# Patient Record
Sex: FEMALE | Race: WHITE | Employment: OTHER | URBAN - NONMETROPOLITAN AREA
[De-identification: names, ages, dates, MRNs, and addresses within clinical notes are randomized per-mention and may not be internally consistent; named-entity substitution may affect disease eponyms.]

---

## 2017-05-15 ENCOUNTER — OFFICE VISIT (OUTPATIENT)
Dept: PRIMARY CARE CLINIC | Age: 37
End: 2017-05-15
Payer: COMMERCIAL

## 2017-05-15 VITALS
WEIGHT: 161 LBS | TEMPERATURE: 98.2 F | SYSTOLIC BLOOD PRESSURE: 133 MMHG | HEIGHT: 63 IN | RESPIRATION RATE: 18 BRPM | BODY MASS INDEX: 28.53 KG/M2 | HEART RATE: 73 BPM | DIASTOLIC BLOOD PRESSURE: 82 MMHG

## 2017-05-15 DIAGNOSIS — I88.9 AXILLARY ADENITIS: ICD-10-CM

## 2017-05-15 DIAGNOSIS — F41.9 ANXIETY: ICD-10-CM

## 2017-05-15 DIAGNOSIS — Z00.00 WELLNESS EXAMINATION: Primary | ICD-10-CM

## 2017-05-15 PROCEDURE — 99385 PREV VISIT NEW AGE 18-39: CPT | Performed by: FAMILY MEDICINE

## 2017-05-15 RX ORDER — SERTRALINE HYDROCHLORIDE 25 MG/1
25 TABLET, FILM COATED ORAL DAILY
Qty: 30 TABLET | Refills: 3 | Status: SHIPPED | OUTPATIENT
Start: 2017-05-15 | End: 2017-08-10 | Stop reason: SDUPTHER

## 2017-05-15 ASSESSMENT — ENCOUNTER SYMPTOMS
VOMITING: 0
NAUSEA: 0
ABDOMINAL PAIN: 0
EYE DISCHARGE: 0
COUGH: 0
BACK PAIN: 0
COLOR CHANGE: 0
WHEEZING: 0
DIARRHEA: 0

## 2017-08-10 ENCOUNTER — OFFICE VISIT (OUTPATIENT)
Dept: PRIMARY CARE CLINIC | Age: 37
End: 2017-08-10
Payer: COMMERCIAL

## 2017-08-10 VITALS
TEMPERATURE: 98.3 F | HEART RATE: 77 BPM | HEIGHT: 63 IN | SYSTOLIC BLOOD PRESSURE: 120 MMHG | BODY MASS INDEX: 29.12 KG/M2 | OXYGEN SATURATION: 99 % | WEIGHT: 164.38 LBS | DIASTOLIC BLOOD PRESSURE: 80 MMHG

## 2017-08-10 DIAGNOSIS — R22.33 AXILLARY MASS, BILATERAL: Primary | ICD-10-CM

## 2017-08-10 PROCEDURE — 99213 OFFICE O/P EST LOW 20 MIN: CPT | Performed by: NURSE PRACTITIONER

## 2018-08-31 ENCOUNTER — OFFICE VISIT (OUTPATIENT)
Dept: URGENT CARE | Age: 38
End: 2018-08-31
Payer: COMMERCIAL

## 2018-08-31 VITALS
DIASTOLIC BLOOD PRESSURE: 83 MMHG | HEIGHT: 63 IN | RESPIRATION RATE: 20 BRPM | SYSTOLIC BLOOD PRESSURE: 119 MMHG | HEART RATE: 74 BPM | BODY MASS INDEX: 28.81 KG/M2 | TEMPERATURE: 99.1 F | OXYGEN SATURATION: 97 % | WEIGHT: 162.6 LBS

## 2018-08-31 DIAGNOSIS — J02.0 STREP PHARYNGITIS: Primary | ICD-10-CM

## 2018-08-31 LAB — S PYO AG THROAT QL: POSITIVE

## 2018-08-31 PROCEDURE — 87880 STREP A ASSAY W/OPTIC: CPT | Performed by: SPECIALIST

## 2018-08-31 PROCEDURE — 99213 OFFICE O/P EST LOW 20 MIN: CPT | Performed by: SPECIALIST

## 2018-08-31 RX ORDER — AMOXICILLIN AND CLAVULANATE POTASSIUM 875; 125 MG/1; MG/1
1 TABLET, FILM COATED ORAL EVERY 12 HOURS
Qty: 20 TABLET | Refills: 0 | Status: SHIPPED | OUTPATIENT
Start: 2018-08-31 | End: 2018-09-10

## 2018-08-31 ASSESSMENT — ENCOUNTER SYMPTOMS
COUGH: 1
SORE THROAT: 1

## 2018-08-31 NOTE — PROGRESS NOTES
external ear normal.   Mouth/Throat: Uvula is midline and mucous membranes are normal. Posterior oropharyngeal erythema present. Neck: Trachea normal.   Cardiovascular: Normal rate, regular rhythm, S1 normal, S2 normal and normal heart sounds. Pulmonary/Chest: Effort normal and breath sounds normal.   Lymphadenopathy:     She has cervical adenopathy. Neurological: She is alert and oriented to person, place, and time. Skin: Skin is warm, dry and intact. Psychiatric: She has a normal mood and affect. Her behavior is normal. Thought content normal.   Nursing note and vitals reviewed. /83   Pulse 74   Temp 99.1 °F (37.3 °C) (Oral)   Resp 20   Ht 5' 3\" (1.6 m)   Wt 162 lb 9.6 oz (73.8 kg)   LMP 08/10/2018   SpO2 97%   Breastfeeding? Yes   BMI 28.80 kg/m²     Assessment:       Diagnosis Orders   1. Strep pharyngitis  POCT rapid strep A     Results for orders placed or performed in visit on 08/31/18   POCT rapid strep A   Result Value Ref Range    Strep A Ag Positive (A) None Detected         Plan:      Orders Placed This Encounter   Procedures    POCT rapid strep A       No Follow-up on file. Orders Placed This Encounter   Procedures    POCT rapid strep A     Orders Placed This Encounter   Medications    amoxicillin-clavulanate (AUGMENTIN) 875-125 MG per tablet     Sig: Take 1 tablet by mouth every 12 hours for 10 days     Dispense:  20 tablet     Refill:  0       Patient given educational materials - see patient instructions. Discussed use, benefit, and side effects of prescribed medications. All patient questions answered. Pt voiced understanding. Reviewed health maintenance. Instructed to continue current medications, diet and exercise. Patient agreed with treatment plan. Follow up as directed.      Patient Instructions       Patient Education        Strep Throat: Care Instructions  Your Care Instructions    Strep throat is a bacterial infection that causes sudden, severe sore

## 2018-11-30 ENCOUNTER — OFFICE VISIT (OUTPATIENT)
Dept: URGENT CARE | Age: 38
End: 2018-11-30
Payer: COMMERCIAL

## 2018-11-30 VITALS
TEMPERATURE: 98.3 F | DIASTOLIC BLOOD PRESSURE: 72 MMHG | HEIGHT: 63 IN | OXYGEN SATURATION: 96 % | WEIGHT: 158.2 LBS | SYSTOLIC BLOOD PRESSURE: 133 MMHG | HEART RATE: 95 BPM | BODY MASS INDEX: 28.03 KG/M2 | RESPIRATION RATE: 18 BRPM

## 2018-11-30 DIAGNOSIS — J06.9 VIRAL URI: Primary | ICD-10-CM

## 2018-11-30 DIAGNOSIS — J02.9 SORE THROAT: ICD-10-CM

## 2018-11-30 LAB — S PYO AG THROAT QL: NORMAL

## 2018-11-30 PROCEDURE — 87880 STREP A ASSAY W/OPTIC: CPT | Performed by: NURSE PRACTITIONER

## 2018-11-30 PROCEDURE — 99213 OFFICE O/P EST LOW 20 MIN: CPT | Performed by: NURSE PRACTITIONER

## 2018-11-30 ASSESSMENT — ENCOUNTER SYMPTOMS
DIARRHEA: 0
COUGH: 1
SORE THROAT: 1
RHINORRHEA: 0
ABDOMINAL PAIN: 0
VOMITING: 0
SHORTNESS OF BREATH: 0
NAUSEA: 0

## 2018-11-30 NOTE — PROGRESS NOTES
98.3 °F (36.8 °C) (Oral)   Resp 18   Ht 5' 3\" (1.6 m)   Wt 158 lb 3.2 oz (71.8 kg)   LMP 11/30/2018   SpO2 96%   BMI 28.02 kg/m²     Assessment:       Diagnosis Orders   1. Viral URI     2. Sore throat  POCT rapid strep A       Plan:    1. Rest and increase fluid intake. 2. otc medicine as needed   3. Monitor for fever and treat as needed with tylenol or ibuprofen  4. If patient is not improving or developing any new/worsening symptoms then return to clinic as needed or follow up with PCP  Orders Placed This Encounter   Procedures    POCT rapid strep A       No Follow-up on file. No orders of the defined types were placed in this encounter. Patient given educational materials- see patient instructions. Discussed use, benefit, and side effects of prescribedmedications. All patient questions answered. Pt voiced understanding. Reviewedhealth maintenance. Instructed to continue current medications, diet and exercise. Patient agreed with treatment plan. Follow up as directed. Patient Instructions       Patient Education        Viral Respiratory Infection: Care Instructions  Your Care Instructions    Viruses are very small organisms. They grow in number after they enter your body. There are many types that cause different illnesses, such as colds and the mumps. The symptoms of a viral respiratory infection often start quickly. They include a fever, sore throat, and runny nose. You may also just not feel well. Or you may not want to eat much. Most viral respiratory infections are not serious. They usually get better with time and self-care. Antibiotics are not used to treat a viral infection. That's because antibiotics will not help cure a viral illness. In some cases, antiviral medicine can help your body fight a serious viral infection. Follow-up care is a key part of your treatment and safety. Be sure to make and go to all appointments, and call your doctor if you are having problems.  It's also a good idea to know your test results and keep a list of the medicines you take. How can you care for yourself at home? · Rest as much as possible until you feel better. · Be safe with medicines. Take your medicine exactly as prescribed. Call your doctor if you think you are having a problem with your medicine. You will get more details on the specific medicine your doctor prescribes. · Take an over-the-counter pain medicine, such as acetaminophen (Tylenol), ibuprofen (Advil, Motrin), or naproxen (Aleve), as needed for pain and fever. Read and follow all instructions on the label. Do not give aspirin to anyone younger than 20. It has been linked to Reye syndrome, a serious illness. · Drink plenty of fluids, enough so that your urine is light yellow or clear like water. Hot fluids, such as tea or soup, may help relieve congestion in your nose and throat. If you have kidney, heart, or liver disease and have to limit fluids, talk with your doctor before you increase the amount of fluids you drink. · Try to clear mucus from your lungs by breathing deeply and coughing. · Gargle with warm salt water once an hour. This can help reduce swelling and throat pain. Use 1 teaspoon of salt mixed in 1 cup of warm water. · Do not smoke or allow others to smoke around you. If you need help quitting, talk to your doctor about stop-smoking programs and medicines. These can increase your chances of quitting for good. To avoid spreading the virus  · Cough or sneeze into a tissue. Then throw the tissue away. · If you don't have a tissue, use your hand to cover your cough or sneeze. Then clean your hand. You can also cough into your sleeve. · Wash your hands often. Use soap and warm water. Wash for 15 to 20 seconds each time. · If you don't have soap and water near you, you can clean your hands with alcohol wipes or gel. When should you call for help?   Call your doctor now or seek immediate medical care if:    · You have a new or higher fever.     · Your fever lasts more than 48 hours.     · You have trouble breathing.     · You have a fever with a stiff neck or a severe headache.     · You are sensitive to light.     · You feel very sleepy or confused.    Watch closely for changes in your health, and be sure to contact your doctor if:    · You do not get better as expected. Where can you learn more? Go to https://[employer]ABand. org and sign in to your [employer] account. Enter J378 in the [employer] box to learn more about \"Viral Respiratory Infection: Care Instructions. \"     If you do not have an account, please click on the \"Sign Up Now\" link. Current as of: December 6, 2017  Content Version: 11.8  © 9011-3718 Healthwise, [employer]. Care instructions adapted under license by [facility] ([facility]). If you have questions about a medical condition or this instruction, always ask your healthcare professional. [place] [address] any warranty or liability for your use of this information. 1. Rest and increase fluid intake. 2. otc medicine as needed   3. Monitor for fever and treat as needed with tylenol or ibuprofen  4.  If patient is not improving or developing any new/worsening symptoms then return to clinic as needed or follow up with PCP          Electronically signed by INGRID Alas on 11/30/2018 at 7:10 PM

## 2018-12-07 ENCOUNTER — OFFICE VISIT (OUTPATIENT)
Dept: URGENT CARE | Age: 38
End: 2018-12-07
Payer: COMMERCIAL

## 2018-12-07 VITALS
SYSTOLIC BLOOD PRESSURE: 127 MMHG | HEART RATE: 106 BPM | TEMPERATURE: 98.7 F | WEIGHT: 156.6 LBS | RESPIRATION RATE: 16 BRPM | HEIGHT: 63 IN | OXYGEN SATURATION: 98 % | BODY MASS INDEX: 27.75 KG/M2 | DIASTOLIC BLOOD PRESSURE: 86 MMHG

## 2018-12-07 DIAGNOSIS — J01.90 ACUTE SINUSITIS, RECURRENCE NOT SPECIFIED, UNSPECIFIED LOCATION: Primary | ICD-10-CM

## 2018-12-07 PROCEDURE — 99213 OFFICE O/P EST LOW 20 MIN: CPT | Performed by: NURSE PRACTITIONER

## 2018-12-07 RX ORDER — CEFPODOXIME PROXETIL 100 MG/1
100 TABLET, FILM COATED ORAL 2 TIMES DAILY
Qty: 20 TABLET | Refills: 0 | Status: SHIPPED | OUTPATIENT
Start: 2018-12-07 | End: 2018-12-17

## 2018-12-07 RX ORDER — FLUTICASONE PROPIONATE 50 MCG
1 SPRAY, SUSPENSION (ML) NASAL DAILY
Qty: 1 BOTTLE | Refills: 0 | Status: SHIPPED | OUTPATIENT
Start: 2018-12-07 | End: 2021-03-31

## 2018-12-07 RX ORDER — METHYLPREDNISOLONE 4 MG/1
TABLET ORAL
Qty: 1 KIT | Refills: 0 | Status: SHIPPED | OUTPATIENT
Start: 2018-12-07 | End: 2021-03-31

## 2018-12-07 RX ORDER — CETIRIZINE HYDROCHLORIDE 10 MG/1
10 TABLET ORAL DAILY
Qty: 10 TABLET | Refills: 0 | Status: SHIPPED | OUTPATIENT
Start: 2018-12-07 | End: 2018-12-17

## 2018-12-07 ASSESSMENT — ENCOUNTER SYMPTOMS
ABDOMINAL PAIN: 0
RHINORRHEA: 0
NAUSEA: 1
COUGH: 1
SORE THROAT: 0
SINUS PRESSURE: 1
VOMITING: 0
SHORTNESS OF BREATH: 0
DIARRHEA: 0

## 2018-12-07 NOTE — PROGRESS NOTES
[address] [facility]  [address] [place] [address]  Dept: [phone]  Loc: [phone]    Ean Arreola is a 43 y.o. female who presents today for her medical conditions/complaintsas noted below. Ean Arreola is c/o of Nausea; Congestion; and Otalgia        HPI:     HPI  Princess presents today with nausea, cough, congestion and ear pain that started last week. She was seen in UC a week ago and dx with viral infection. She reports no fever. She has had chills. She has not had anything for her symptoms. No vomiting. Mo headache. Ill contacts at home. She is breast feeding her two year old. Past Medical History:   Diagnosis Date    Anxiety      Past Surgical History:   Procedure Laterality Date     SECTION         Family History   Problem Relation Age of Onset    High Blood Pressure Mother     Diabetes Maternal Grandfather     Breast Cancer Paternal Grandmother     Heart Attack Paternal Grandfather        Social History   Substance Use Topics    Smoking status: Never Smoker    Smokeless tobacco: Never Used    Alcohol use Yes      Current Outpatient Prescriptions   Medication Sig Dispense Refill    cefpodoxime (VANTIN) 100 MG tablet Take 1 tablet by mouth 2 times daily for 10 days 20 tablet 0    fluticasone (FLONASE) 50 MCG/ACT nasal spray 1 spray by Nasal route daily for 10 days 1 Bottle 0    cetirizine (ZYRTEC) 10 MG tablet Take 1 tablet by mouth daily for 10 days 10 tablet 0    methylPREDNISolone (MEDROL DOSEPACK) 4 MG tablet Take by mouth. 1 kit 0    sertraline (ZOLOFT) 50 MG tablet TAKE ONE TABLET BY MOUTH ONCE DAILY 30 tablet 3     No current facility-administered medications for this visit.       No Known Allergies    Health Maintenance   Topic Date Due    HIV screen  1994    DTaP/Tdap/Td vaccine (1 - Tdap) 1998    Cervical cancer screen  2000    Flu vaccine (1) 2018       Subjective: a list of the medicines you take. How can you care for yourself at home? · Take an over-the-counter pain medicine, such as acetaminophen (Tylenol), ibuprofen (Advil, Motrin), or naproxen (Aleve). Read and follow all instructions on the label. · If the doctor prescribed antibiotics, take them as directed. Do not stop taking them just because you feel better. You need to take the full course of antibiotics. · Be careful when taking over-the-counter cold or flu medicines and Tylenol at the same time. Many of these medicines have acetaminophen, which is Tylenol. Read the labels to make sure that you are not taking more than the recommended dose. Too much acetaminophen (Tylenol) can be harmful. · Breathe warm, moist air from a steamy shower, a hot bath, or a sink filled with hot water. Avoid cold, dry air. Using a humidifier in your home may help. Follow the directions for cleaning the machine. · Use saline (saltwater) nasal washes to help keep your nasal passages open and wash out mucus and bacteria. You can buy saline nose drops at a grocery store or drugstore. Or you can make your own at home by adding 1 teaspoon of salt and 1 teaspoon of baking soda to 2 cups of distilled water. If you make your own, fill a bulb syringe with the solution, insert the tip into your nostril, and squeeze gently. Donkimberlye Mode your nose. · Put a hot, wet towel or a warm gel pack on your face 3 or 4 times a day for 5 to 10 minutes each time. · Try a decongestant nasal spray like oxymetazoline (Afrin). Do not use it for more than 3 days in a row. Using it for more than 3 days can make your congestion worse. When should you call for help?   Call your doctor now or seek immediate medical care if:    · You have new or worse swelling or redness in your face or around your eyes.     · You have a new or higher fever.    Watch closely for changes in your health, and be sure to contact your doctor if:    · You have new or worse facial pain.

## 2019-02-25 ENCOUNTER — OFFICE VISIT (OUTPATIENT)
Dept: OTOLARYNGOLOGY | Facility: CLINIC | Age: 39
End: 2019-02-25

## 2019-02-25 VITALS
WEIGHT: 152 LBS | DIASTOLIC BLOOD PRESSURE: 80 MMHG | HEART RATE: 86 BPM | TEMPERATURE: 98 F | BODY MASS INDEX: 26.93 KG/M2 | SYSTOLIC BLOOD PRESSURE: 134 MMHG | HEIGHT: 63 IN

## 2019-02-25 DIAGNOSIS — D22.9 NEVUS: Primary | ICD-10-CM

## 2019-02-25 PROCEDURE — 99203 OFFICE O/P NEW LOW 30 MIN: CPT | Performed by: OTOLARYNGOLOGY

## 2019-02-25 RX ORDER — DIAZEPAM 5 MG/1
5 TABLET ORAL EVERY 8 HOURS PRN
Qty: 2 TABLET | Refills: 0 | Status: SHIPPED | OUTPATIENT
Start: 2019-02-25 | End: 2019-02-27

## 2019-02-25 NOTE — PATIENT INSTRUCTIONS
MyPlate from [employer]  The general, healthful diet is based on the 2010 Dietary Guidelines for Americans. The amount of food you need to eat from each food group depends on your age, sex, and level of physical activity and can be individualized by a dietitian. Go to ChooseMyPlate.gov for more information.  What do I need to know about the MyPlate plan?  · Enjoy your food, but eat less.  · Avoid oversized portions.  ? ½ of your plate should include fruits and vegetables.  ? ¼ of your plate should be grains.  ? ¼ of your plate should be protein.  Grains  · Make at least half of your grains whole grains.  · For a 2,000 calorie daily food plan, eat 6 oz every day.  · 1 oz is about 1 slice bread, 1 cup cereal, or ½ cup cooked rice, cereal, or pasta.  Vegetables  · Make half your plate fruits and vegetables.  · For a 2,000 calorie daily food plan, eat 2½ cups every day.  · 1 cup is about 1 cup raw or cooked vegetables or vegetable juice or 2 cups raw leafy greens.  Fruits  · Make half your plate fruits and vegetables.  · For a 2,000 calorie daily food plan, eat 2 cups every day.  · 1 cup is about 1 cup fruit or 100% fruit juice or ½ cup dried fruit.  Protein  · For a 2,000 calorie daily food plan, eat 5½ oz every day.  · 1 oz is about 1 oz meat, poultry, or fish, ¼ cup cooked beans, 1 egg, 1 Tbsp peanut butter, or ½ oz nuts or seeds.  Dairy  · Switch to fat-free or low-fat (1%) milk.  · For a 2,000 calorie daily food plan, eat 3 cups every day.  · 1 cup is about 1 cup milk or yogurt or soy milk (soy beverage), 1½ oz natural cheese, or 2 oz processed cheese.  Fats, Oils, and Empty Calories  · Only small amounts of oils are recommended.  · Empty calories are calories from solid fats or added sugars.  · Compare sodium in foods like soup, bread, and frozen meals. Choose the foods with lower numbers.  · Drink water instead of sugary drinks.  What foods can I eat?  Grains  Whole grains such as whole wheat, quinoa, millet, and  bulgur. Bread, rolls, and pasta made from whole grains. Brown or wild rice. Hot or cold cereals made from whole grains and without added sugar.  Vegetables  All fresh vegetables, especially fresh red, dark green, or orange vegetables. Peas and beans. Low-sodium frozen or canned vegetables prepared without added salt. Low-sodium vegetable juices.  Fruits  All fresh, frozen, and dried fruits. Canned fruit packed in water or fruit juice without added sugar. Fruit juices without added sugar.  Meats and Other Protein Sources  Boiled, baked, or grilled lean meat trimmed of fat. Skinless poultry. Fresh seafood and shellfish. Canned seafood packed in water. Unsalted nuts and unsalted nut butters. Tofu. Dried beans and pea. Eggs.  Dairy  Low-fat or fat-free milk, yogurt, and cheeses.  Sweets and Desserts  Frozen desserts made from low-fat milk.  Fats and Oils  Olive, peanut, and canola oils and margarine. Salad dressing and mayonnaise made from these oils.  Other  Soups and casseroles made from allowed ingredients and without added fat or salt.  The items listed above may not be a complete list of recommended foods or beverages. Contact your dietitian for more options.  What foods are not recommended?  Grains  Sweetened, low-fiber cereals. Packaged baked goods. Snack crackers and chips. Cheese crackers, butter crackers, and biscuits. Frozen waffles, sweet breads, doughnuts, pastries, packaged baking mixes, pancakes, cakes, and cookies.  Vegetables  Regular canned or frozen vegetables or vegetables prepared with salt. Canned tomatoes. Canned tomato sauce. Fried vegetables. Vegetables in cream sauce or cheese sauce.  Fruits  Fruits packed in syrup or made with added sugar.  Meats and Other Protein Sources  Marbled or fatty meats such as ribs. Poultry with skin. Fried meats, poultry, eggs, or fish. Sausages, hot dogs, and deli meats such as pastrami, bologna, or salami.  Dairy  Whole milk, cream, cheeses made from whole milk,  sour cream. Ice cream or yogurt made from whole milk or with added sugar.  Beverages  For adults, no more than one alcoholic drink per day. Regular soft drinks or other sugary beverages. Juice drinks.  Sweets and Desserts  Sugary or fatty desserts, candy, and other sweets.  Fats and Oils  Solid shortening or partially hydrogenated oils. Solid margarine. Margarine that contains trans fats. Butter.  The items listed above may not be a complete list of foods and beverages to avoid. Contact your dietitian for more information.  This information is not intended to replace advice given to you by your health care provider. Make sure you discuss any questions you have with your health care provider.  Document Released: 01/06/2009 Document Revised: 05/25/2017 Document Reviewed: 11/26/2014  Elsevier Interactive Patient Education © 2018 Elsevier Inc.

## 2019-02-25 NOTE — PROGRESS NOTES
"CC:   Chief Complaint   Patient presents with   • Skin Lesion     New patient here with c/o multiple skin lesions on nose.        HPI: Caty Brown is a 38 y.o. female who reports multiple lesions of the nose.  She has had a lesion of the right supra-alar crease for most of her life.  This is slowly getting larger.  Nothing makes it better or worse.  It is not bled.  She has no nasal obstruction.  She also has a similar lesion on the left supra-alar crease which is much smaller and not changing.  Nothing has made this better or worse.  There is an additional lesion on the right nasal sidewall at the junction to the dorsal subunit which is erythematous.  This is not been bleeding.  Nothing makes this better or worse.    Prior history of skin cancer: None    Dermatologist: Betsy Al MD      PFSH:  Past Medical History:   Diagnosis Date   • PONV (postoperative nausea and vomiting)     Nauseated      Past Surgical History:   Procedure Laterality Date   •  SECTION       Family History   Problem Relation Age of Onset   • Breast cancer Paternal Grandmother      Social History     Tobacco Use   • Smoking status: Never Smoker   • Smokeless tobacco: Never Used   Substance Use Topics   • Alcohol use: No   • Drug use: No     Allergies:  Patient has no known allergies.    Current Outpatient Medications:   •  sertraline (ZOLOFT) 50 MG tablet, Take 1 tablet by mouth Daily., Disp: , Rfl:   •  diazePAM (VALIUM) 5 MG tablet, Take 1 tablet by mouth Every 8 (Eight) Hours As Needed for Anxiety for up to 2 days., Disp: 2 tablet, Rfl: 0    ROS:  Review of Systems   Constitutional: Negative for chills, fever and unexpected weight change.   Musculoskeletal: Negative for neck pain and neck stiffness.   Skin: Positive for color change. Negative for wound.   Hematological: Negative for adenopathy. Does not bruise/bleed easily.       PE:  /80   Pulse 86   Temp 98 °F (36.7 °C) (Temporal)   Ht 160 cm (63\")   Wt 68.9 kg " (152 lb)   Breastfeeding? Unknown   BMI 26.93 kg/m²   Physical Exam   Constitutional: She is oriented to person, place, and time. She appears well-developed and well-nourished. She is cooperative. No distress.   HENT:   Head: Normocephalic and atraumatic.   Right Ear: External ear normal.   Left Ear: External ear normal.   Nose: Nose normal.       Mouth/Throat: Oropharynx is clear and moist.   Eyes: Conjunctivae and EOM are normal. Pupils are equal, round, and reactive to light. Right eye exhibits no discharge. Left eye exhibits no discharge. No scleral icterus.   Neck: Normal range of motion. Neck supple. No JVD present. No tracheal deviation present. No thyromegaly present.   Pulmonary/Chest: Effort normal. No stridor.   Musculoskeletal: Normal range of motion. She exhibits no edema or deformity.   Lymphadenopathy:     She has no cervical adenopathy.   Neurological: She is alert and oriented to person, place, and time. She has normal strength. No cranial nerve deficit. Coordination normal.   Skin: Skin is warm and dry. No rash noted. She is not diaphoretic. No erythema. No pallor.   Psychiatric: She has a normal mood and affect. Her speech is normal and behavior is normal. Judgment and thought content normal. Cognition and memory are normal.   Nursing note and vitals reviewed.                Assessment:  1. Nevus        Plan:    No orders of the defined types were placed in this encounter.        1.  I have offered excision of the nevus of the right supra-alar crease and IPL to the lesion at the junction of the dorsum and right sidewall.  I discussed these lesions do not appear worrisome.  These would be considered cosmetic, as the nonchanging, non-bothersome lesions.  She understands this.  A cosmetic quote was provided.  I would send the nevus for pathology.    2. The risks and benefits of my recommendations, as well as other treatment options were discussed with the patient today.   3. Discussion of skin  lesion. Discussed risks, benefits, alternatives, and possible complications of excision of the skin lesion with reconstruction utilizing local tissue rearrangement, full-thickness skin grafting, or local interpolated flaps. Risks include, but are not limited too: bleeding, infection, hematoma, recurrence, need for additional procedures, flap failure, cosmetic deformity. Patient understands risks and would like to proceed with surgery.     QUALITY MEASURES    Body Mass Index Screening and Follow-Up Plan  Body mass index is 26.93 kg/m².  Patient's Body mass index is 26.93 kg/m². BMI is above normal parameters. Recommendations include: educational material.    Tobacco Use: Screening and Cessation Intervention  Social History    Tobacco Use      Smoking status: Never Smoker      Smokeless tobacco: Never Used          No Follow-up on file.      Roberto Espitia MD   02/25/2019  10:01 AM

## 2021-03-31 ENCOUNTER — OFFICE VISIT (OUTPATIENT)
Dept: PRIMARY CARE CLINIC | Age: 41
End: 2021-03-31
Payer: COMMERCIAL

## 2021-03-31 VITALS
HEART RATE: 73 BPM | DIASTOLIC BLOOD PRESSURE: 60 MMHG | TEMPERATURE: 98 F | WEIGHT: 160 LBS | BODY MASS INDEX: 28.35 KG/M2 | HEIGHT: 63 IN | RESPIRATION RATE: 16 BRPM | SYSTOLIC BLOOD PRESSURE: 110 MMHG

## 2021-03-31 DIAGNOSIS — Z01.419 WELL WOMAN EXAM WITH ROUTINE GYNECOLOGICAL EXAM: Primary | ICD-10-CM

## 2021-03-31 DIAGNOSIS — Z12.31 SCREENING MAMMOGRAM, ENCOUNTER FOR: ICD-10-CM

## 2021-03-31 DIAGNOSIS — R53.83 FATIGUE, UNSPECIFIED TYPE: ICD-10-CM

## 2021-03-31 DIAGNOSIS — R63.5 WEIGHT GAIN: ICD-10-CM

## 2021-03-31 DIAGNOSIS — Z12.4 ROUTINE CERVICAL SMEAR: ICD-10-CM

## 2021-03-31 PROCEDURE — 99386 PREV VISIT NEW AGE 40-64: CPT | Performed by: NURSE PRACTITIONER

## 2021-03-31 RX ORDER — SERTRALINE HYDROCHLORIDE 100 MG/1
100 TABLET, FILM COATED ORAL DAILY
COMMUNITY
End: 2021-03-31 | Stop reason: SDUPTHER

## 2021-03-31 RX ORDER — SERTRALINE HYDROCHLORIDE 100 MG/1
100 TABLET, FILM COATED ORAL DAILY
Qty: 90 TABLET | Refills: 3 | Status: SHIPPED | OUTPATIENT
Start: 2021-03-31 | End: 2022-06-22 | Stop reason: SDUPTHER

## 2021-03-31 ASSESSMENT — PATIENT HEALTH QUESTIONNAIRE - PHQ9
1. LITTLE INTEREST OR PLEASURE IN DOING THINGS: 0
SUM OF ALL RESPONSES TO PHQ QUESTIONS 1-9: 0
SUM OF ALL RESPONSES TO PHQ9 QUESTIONS 1 & 2: 0
SUM OF ALL RESPONSES TO PHQ QUESTIONS 1-9: 0
2. FEELING DOWN, DEPRESSED OR HOPELESS: 0

## 2021-03-31 ASSESSMENT — ENCOUNTER SYMPTOMS
GASTROINTESTINAL NEGATIVE: 1
EYES NEGATIVE: 1
RESPIRATORY NEGATIVE: 1

## 2021-03-31 NOTE — PROGRESS NOTES
[facility] PHYSICIAN SERVICES  [facility]  [address] [place] [address]  [address] [address]  Dept: [phone]  Dept Fax: [phone]  Loc: [phone]    Alcon Vargas is a 38 y.o. female who presents today for her medical conditions/complaints as noted below. Alcon Vargas is c/o of Established New Doctor (patient presents today to get re-established. It has been several years since she has been seen. Patient is needing a physical and pap. )        HPI:     HPI   Chief Complaint   Patient presents with    Established New Doctor     patient presents today to get re-established. It has been several years since she has been seen. Patient is needing a physical and pap. she wants to have thyroid checked, child has hypo thyroid. They are unsure how he got it and she would like to have her ears checked. She is not been able to lose weight she has tried multiple times for diet exercise and nothing has worked. She also complains of fatigue and being tired by the end of the day. 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   Past Medical History:   Diagnosis Date    Anxiety       Past Surgical History:   Procedure Laterality Date     SECTION      x3       Vitals 3/31/2021 2018 2018 2018 8/10/2017    SYSTOLIC [phone] [phone]   DIASTOLIC 60 86 72 83 80 82   Site - - - - Left Arm Left Arm   Position - - - - Sitting Sitting   Cuff Size - - - - Medium Adult Large Adult   Pulse 73 106 95 74 77 73   Temp 98 98.7 98.3 99.1 98.3 98.2   Resp 16 16 18 20 - 18   SpO2 - 98 96 97 99 -   Weight 160 lb 156 lb 9.6 oz 158 lb 3.2 oz 162 lb 9.6 oz 164 lb 6 oz 161 lb   Height 5' 3\" 5' 3\" 5' 3\" 5' 3\" 5' 3\" 5' 3\"   Body mass index 28.34 kg/m2 27.74 kg/m2 28.02 kg/m2 28.8 kg/m2 29.11 kg/m2 28.52 kg/m2       Family History   Problem Relation Age of Onset    High Blood Pressure Mother     Diabetes Maternal Grandfather     Breast Cancer Paternal Grandmother     Heart Attack Paternal Grandfather        Social History     Tobacco Use    Smoking status: Never Smoker    Smokeless tobacco: Never Used   Substance Use Topics    Alcohol use: Yes     Frequency: 2-4 times a month      Current Outpatient Medications   Medication Sig Dispense Refill    sertraline (ZOLOFT) 100 MG tablet Take 1 tablet by mouth daily 90 tablet 3     No current facility-administered medications for this visit. No Known Allergies    Health Maintenance   Topic Date Due    Hepatitis C screen  Never done    HIV screen  Never done    COVID-19 Vaccine (1) Never done    DTaP/Tdap/Td vaccine (1 - Tdap) Never done    Cervical cancer screen  Never done    Lipid screen  Never done    Diabetes screen  Never done    Flu vaccine (1) Never done    Hepatitis A vaccine  Aged Out    Hepatitis B vaccine  Aged Out    Hib vaccine  Aged Out    Meningococcal (ACWY) vaccine  Aged Out    Pneumococcal 0-64 years Vaccine  Aged Out       Subjective:      Review of Systems   Constitutional: Positive for fatigue and unexpected weight change. HENT: Negative. Eyes: Negative. Respiratory: Negative. Cardiovascular: Negative. Gastrointestinal: Negative. Genitourinary: Negative. Musculoskeletal: Negative. Skin: Negative. Neurological: Negative. Psychiatric/Behavioral: Negative. Objective:     Physical Exam  Vitals signs and nursing note reviewed. Exam conducted with a chaperone present. Constitutional:       Appearance: She is well-developed. HENT:      Head: Normocephalic. Right Ear: Tympanic membrane and external ear normal.      Left Ear: Tympanic membrane and external ear normal.   Eyes:      Pupils: Pupils are equal, round, and reactive to light. Neck:      Musculoskeletal: Normal range of motion. Cardiovascular:      Rate and Rhythm: Normal rate and regular rhythm. Pulses: Normal pulses. Heart sounds: Normal heart sounds.    Pulmonary:      Effort: Pulmonary effort is normal. Breath sounds: Normal breath sounds. Chest:      Breasts:         Right: Normal.         Left: Normal.   Abdominal:      General: Abdomen is flat. Bowel sounds are normal.   Genitourinary:     Exam position: Lithotomy position. Labia:         Right: No rash, tenderness, lesion or injury. Left: No rash, tenderness, lesion or injury. Vagina: Normal.      Cervix: Normal.      Uterus: Normal.       Adnexa: Right adnexa normal and left adnexa normal.      Comments: Pap smear collected from the cervix  Musculoskeletal: Normal range of motion. Skin:     General: Skin is warm and dry. Capillary Refill: Capillary refill takes less than 2 seconds. Neurological:      General: No focal deficit present. Mental Status: She is alert and oriented to person, place, and time. Psychiatric:         Mood and Affect: Mood normal.         Behavior: Behavior normal.         Thought Content: Thought content normal.         Judgment: Judgment normal.       /60   Pulse 73   Temp 98 °F (36.7 °C) (Temporal)   Resp 16   Ht 5' 3\" (1.6 m)   Wt 160 lb (72.6 kg)   Breastfeeding No   BMI 28.34 kg/m²     Assessment:       Diagnosis Orders   1. Well woman exam with routine gynecological exam     2. Screening mammogram, encounter for  CHERELLE DIGITAL SCREEN W OR WO CAD BILATERAL   3. Fatigue, unspecified type  CBC Auto Differential    Comprehensive Metabolic Panel    TSH without Reflex    T4, Free    Lipid Panel    Estradiol    Progesterone    T3, Free   4. Weight gain  CBC Auto Differential    Comprehensive Metabolic Panel    TSH without Reflex    T4, Free    Lipid Panel    Estradiol    Progesterone    T3, Free   5. Routine cervical smear  PAP SMEAR         Plan:          Patient given educational materials -see patient instructions. Discussed use, benefit, and side effects of prescribed medications. All patient questions answered. Pt voiced understanding. Reviewed health maintenance.   Instructed to continue currentmedications, diet and exercise. Patient agreed with treatment plan. Follow up as directed. MEDICATIONS:  Orders Placed This Encounter   Medications    sertraline (ZOLOFT) 100 MG tablet     Sig: Take 1 tablet by mouth daily     Dispense:  90 tablet     Refill:  3         ORDERS:  Orders Placed This Encounter   Procedures    CHERELLE DIGITAL SCREEN W OR WO CAD BILATERAL    CBC Auto Differential    Comprehensive Metabolic Panel    TSH without Reflex    T4, Free    Lipid Panel    Estradiol    Progesterone    T3, Free    PAP SMEAR       Follow-up:  Return in about 1 year (around 3/31/2022) for return next week for fasting labs. PATIENT INSTRUCTIONS:  There are no Patient Instructions on file for this visit. Electronically signed by Jacquelin Dancer, APRN - CNP on 3/31/2021 at 5:48 PM    EMR Dragon/transcription disclaimer:  Much of thisencounter note is electronic transcription/translation of spoken language to printed texts. The electronic translation of spoken language may be erroneous, or at times, nonsensical words or phrases may be inadvertentlytranscribed.   Although I have reviewed the note for such errors, some may still exist.

## 2021-04-07 ENCOUNTER — NURSE ONLY (OUTPATIENT)
Dept: PRIMARY CARE CLINIC | Age: 41
End: 2021-04-07
Payer: COMMERCIAL

## 2021-04-07 DIAGNOSIS — R63.5 WEIGHT GAIN: ICD-10-CM

## 2021-04-07 DIAGNOSIS — R53.83 FATIGUE, UNSPECIFIED TYPE: ICD-10-CM

## 2021-04-07 LAB
ALBUMIN SERPL-MCNC: 4.5 G/DL (ref 3.5–5.2)
ALP BLD-CCNC: 63 U/L (ref 35–104)
ALT SERPL-CCNC: 25 U/L (ref 5–33)
ANION GAP SERPL CALCULATED.3IONS-SCNC: 12 MMOL/L (ref 7–19)
AST SERPL-CCNC: 17 U/L (ref 5–32)
BASOPHILS ABSOLUTE: 0 K/UL (ref 0–0.2)
BASOPHILS RELATIVE PERCENT: 0.4 % (ref 0–1)
BILIRUB SERPL-MCNC: <0.2 MG/DL (ref 0.2–1.2)
BUN BLDV-MCNC: 14 MG/DL (ref 6–20)
CALCIUM SERPL-MCNC: 9.2 MG/DL (ref 8.6–10)
CHLORIDE BLD-SCNC: 103 MMOL/L (ref 98–111)
CHOLESTEROL, TOTAL: 194 MG/DL (ref 160–199)
CO2: 25 MMOL/L (ref 22–29)
CORTISOL TOTAL: 8.8 UG/DL
CREAT SERPL-MCNC: 0.6 MG/DL (ref 0.5–0.9)
EOSINOPHILS ABSOLUTE: 0.1 K/UL (ref 0–0.6)
EOSINOPHILS RELATIVE PERCENT: 1 % (ref 0–5)
ESTRADIOL LEVEL: 199.9 PG/ML
GFR AFRICAN AMERICAN: >59
GFR NON-AFRICAN AMERICAN: >60
GLUCOSE BLD-MCNC: 88 MG/DL (ref 74–109)
HCT VFR BLD CALC: 38 % (ref 37–47)
HDLC SERPL-MCNC: 70 MG/DL (ref 65–121)
HEMOGLOBIN: 12.3 G/DL (ref 12–16)
IMMATURE GRANULOCYTES #: 0 K/UL
LDL CHOLESTEROL CALCULATED: 110 MG/DL
LYMPHOCYTES ABSOLUTE: 1.7 K/UL (ref 1.1–4.5)
LYMPHOCYTES RELATIVE PERCENT: 23.8 % (ref 20–40)
MCH RBC QN AUTO: 29.4 PG (ref 27–31)
MCHC RBC AUTO-ENTMCNC: 32.4 G/DL (ref 33–37)
MCV RBC AUTO: 90.7 FL (ref 81–99)
MONOCYTES ABSOLUTE: 0.4 K/UL (ref 0–0.9)
MONOCYTES RELATIVE PERCENT: 5.3 % (ref 0–10)
NEUTROPHILS ABSOLUTE: 5 K/UL (ref 1.5–7.5)
NEUTROPHILS RELATIVE PERCENT: 69.2 % (ref 50–65)
PDW BLD-RTO: 12.3 % (ref 11.5–14.5)
PLATELET # BLD: 312 K/UL (ref 130–400)
PMV BLD AUTO: 10.7 FL (ref 9.4–12.3)
POTASSIUM SERPL-SCNC: 4.7 MMOL/L (ref 3.5–5)
PROGESTERONE LEVEL: 15.6 NG/ML
RBC # BLD: 4.19 M/UL (ref 4.2–5.4)
SODIUM BLD-SCNC: 140 MMOL/L (ref 136–145)
T3 FREE: 2.5 PG/ML (ref 2–4.4)
T4 FREE: 0.97 NG/DL (ref 0.93–1.7)
TOTAL PROTEIN: 7.5 G/DL (ref 6.6–8.7)
TRIGL SERPL-MCNC: 69 MG/DL (ref 0–149)
TSH SERPL DL<=0.05 MIU/L-ACNC: 1.39 UIU/ML (ref 0.27–4.2)
WBC # BLD: 7.2 K/UL (ref 4.8–10.8)

## 2021-04-07 PROCEDURE — 36415 COLL VENOUS BLD VENIPUNCTURE: CPT | Performed by: NURSE PRACTITIONER

## 2022-04-21 RX ORDER — SERTRALINE HYDROCHLORIDE 100 MG/1
100 TABLET, FILM COATED ORAL DAILY
Qty: 90 TABLET | Refills: 3 | OUTPATIENT
Start: 2022-04-21

## 2022-06-22 ENCOUNTER — OFFICE VISIT (OUTPATIENT)
Dept: PRIMARY CARE CLINIC | Age: 42
End: 2022-06-22
Payer: COMMERCIAL

## 2022-06-22 VITALS
SYSTOLIC BLOOD PRESSURE: 110 MMHG | DIASTOLIC BLOOD PRESSURE: 80 MMHG | WEIGHT: 144 LBS | BODY MASS INDEX: 26.5 KG/M2 | TEMPERATURE: 98.3 F | HEART RATE: 89 BPM | RESPIRATION RATE: 16 BRPM | HEIGHT: 62 IN

## 2022-06-22 DIAGNOSIS — T14.8XXA HEMATOMA: Primary | ICD-10-CM

## 2022-06-22 DIAGNOSIS — N63.0 MASS OF BREAST: ICD-10-CM

## 2022-06-22 DIAGNOSIS — S20.01XA CONTUSION OF RIGHT BREAST, INITIAL ENCOUNTER: ICD-10-CM

## 2022-06-22 PROCEDURE — 99213 OFFICE O/P EST LOW 20 MIN: CPT | Performed by: NURSE PRACTITIONER

## 2022-06-22 RX ORDER — SERTRALINE HYDROCHLORIDE 100 MG/1
100 TABLET, FILM COATED ORAL DAILY
Qty: 90 TABLET | Refills: 3 | Status: SHIPPED | OUTPATIENT
Start: 2022-06-22

## 2022-06-22 RX ORDER — PHENTERMINE HYDROCHLORIDE 37.5 MG/1
37.5 TABLET ORAL
COMMUNITY

## 2022-06-22 ASSESSMENT — PATIENT HEALTH QUESTIONNAIRE - PHQ9
SUM OF ALL RESPONSES TO PHQ9 QUESTIONS 1 & 2: 0
SUM OF ALL RESPONSES TO PHQ QUESTIONS 1-9: 0
1. LITTLE INTEREST OR PLEASURE IN DOING THINGS: 0
2. FEELING DOWN, DEPRESSED OR HOPELESS: 0

## 2022-06-22 NOTE — PROGRESS NOTES
[facility] PHYSICIAN SERVICES  [facility]  [address] [place] [address]  [address] [address]  Dept: [phone]  Dept Fax: [phone]  Loc: [phone]    Melania Ramirez is a 36 y.o. female who presents today for her medical conditions/complaints as noted below. Melania Ramirez is c/o of Breast Pain (patient presents today with c/o bruise on right breast. About 2 weeks ago she was lifting a saddle to put on a horse and thinks she may have hit that right breast. She has felt a lump and wants to make sure everything is ok. She had mammogram last year.)        HPI:     HPI   Chief Complaint   Patient presents with    Breast Pain     patient presents today with c/o bruise on right breast. About 2 weeks ago she was lifting a saddle to put on a horse and thinks she may have hit that right breast. She has felt a lump and wants to make sure everything is ok. She had mammogram last year. 2 weeks ago she was lifting a saddle and she felt a little pain on her breast but did not see anything in the next day she woke up with a bruise on her breast.  Was not painful band but its become painful over the last few days and she has a knot in her breast just above the bruising. She had a normal mammogram last year.   Past Medical History:   Diagnosis Date    Anxiety       Past Surgical History:   Procedure Laterality Date     SECTION      x3       Vitals 2022 3/31/2021 2018 2018 2018    SYSTOLIC 652 319 [phone]   DIASTOLIC 80 60 86 72 83 80   Site - - - - - Left Arm   Position - - - - - Sitting   Cuff Size - - - - - Medium Adult   Pulse 89 73 106 95 74 77   Temp 98.3 98 98.7 98.3 99.1 98.3   Resp 16 16 16 18 20 -   SpO2 - - 98 96 97 99   Weight 144 lb 160 lb 156 lb 9.6 oz 158 lb 3.2 oz 162 lb 9.6 oz 164 lb 6 oz   Height 5' 2\" 5' 3\" 5' 3\" 5' 3\" 5' 3\" 5' 3\"   Body mass index 26.34 kg/m2 28.34 kg/m2 27.74 kg/m2 28.02 kg/m2 28.8 kg/m2 29.11 kg/m2       Family History   Problem Relation Age of Onset    High Blood Pressure Mother     Diabetes Maternal Grandfather     Breast Cancer Paternal Grandmother     Heart Attack Paternal Grandfather        Social History     Tobacco Use    Smoking status: Never Smoker    Smokeless tobacco: Never Used   Substance Use Topics    Alcohol use: Yes      Current Outpatient Medications on File Prior to Visit   Medication Sig Dispense Refill    phentermine (ADIPEX-P) 37.5 MG tablet Take 37.5 mg by mouth every morning (before breakfast). No current facility-administered medications on file prior to visit. No Known Allergies    Health Maintenance   Topic Date Due    COVID-19 Vaccine (1) Never done    HIV screen  Never done    Hepatitis C screen  Never done    DTaP/Tdap/Td vaccine (1 - Tdap) Never done    Depression Screen  03/31/2022    Flu vaccine (Season Ended) 09/01/2022    Cervical cancer screen  04/06/2024    Lipids  04/07/2026    Hepatitis A vaccine  Aged Out    Hepatitis B vaccine  Aged Out    Hib vaccine  Aged Out    Meningococcal (ACWY) vaccine  Aged Out    Pneumococcal 0-64 years Vaccine  Aged Out       Subjective:      Review of Systems   Constitutional: Negative. Genitourinary:        Breast hematoma   Psychiatric/Behavioral: Negative. Objective:     Physical Exam  Vitals and nursing note reviewed. Constitutional:       Appearance: Normal appearance. She is well-developed. HENT:      Head: Normocephalic. Right Ear: External ear normal.      Left Ear: External ear normal.   Eyes:      Pupils: Pupils are equal, round, and reactive to light. Cardiovascular:      Rate and Rhythm: Normal rate. Pulmonary:      Effort: Pulmonary effort is normal.   Chest:       Musculoskeletal:      Cervical back: Normal range of motion. Skin:     General: Skin is warm and dry. Neurological:      Mental Status: She is alert and oriented to person, place, and time.    Psychiatric:         Behavior: Behavior normal. Thought Content: Thought content normal.         Judgment: Judgment normal.       /80   Pulse 89   Temp 98.3 °F (36.8 °C) (Temporal)   Resp 16   Ht 5' 2\" (1.575 m)   Wt 144 lb (65.3 kg)   Breastfeeding No   BMI 26.34 kg/m²     Assessment:       Diagnosis Orders   1. Hematoma  US BREAST COMPLETE RIGHT   2. Contusion of right breast, initial encounter  [place]   3. Mass of breast  US BREAST COMPLETE RIGHT         Plan:   More than 50% of the time was spent counseling and coordinating care for a total time of 25min face to face. PDMP Monitoring:    Last PDMP [place] as Reviewed [facility]):  Review User Review Instant Review Result            Urine Drug Screenings (1 yr)    No resulted procedures found. Medication Contract and Consent for Opioid Use Documents Filed      No documents found                 Patient given educational materials -see patient instructions. Discussed use, benefit, and side effects of prescribed medications. All patient questions answered. Pt voiced understanding. Reviewed health maintenance. Instructed to continue currentmedications, diet and exercise. Patient agreed with treatment plan. Follow up as directed. MEDICATIONS:  Orders Placed This Encounter   Medications    sertraline (ZOLOFT) 100 MG tablet     Sig: Take 1 tablet by mouth daily     Dispense:  90 tablet     Refill:  3         ORDERS:  Orders Placed This Encounter   Procedures    US BREAST COMPLETE RIGHT       Follow-up:  Return for 1-2 weeks for PE with pap. PATIENT INSTRUCTIONS:  There are no Patient Instructions on file for this visit. Electronically signed by INGRID Pringle CNP on 6/22/2022 at 1:32 PM    EMR Dragon/transcription disclaimer:  Much of thisencounter note is electronic transcription/translation of spoken language to printed texts.   The electronic translation of spoken language may be erroneous, or at times, nonsensical words or phrases may be inadvertentlytranscribed.   Although I have reviewed the note for such errors, some may still exist.

## 2022-06-23 DIAGNOSIS — S20.01XA CONTUSION OF RIGHT BREAST, INITIAL ENCOUNTER: ICD-10-CM

## 2022-06-23 DIAGNOSIS — N63.0 MASS OF BREAST: ICD-10-CM

## 2022-06-23 DIAGNOSIS — T14.8XXA HEMATOMA: ICD-10-CM

## 2022-06-28 ENCOUNTER — OFFICE VISIT (OUTPATIENT)
Dept: PRIMARY CARE CLINIC | Age: 42
End: 2022-06-28
Payer: COMMERCIAL

## 2022-06-28 VITALS
BODY MASS INDEX: 26.31 KG/M2 | TEMPERATURE: 97.3 F | SYSTOLIC BLOOD PRESSURE: 120 MMHG | HEIGHT: 62 IN | DIASTOLIC BLOOD PRESSURE: 70 MMHG | HEART RATE: 84 BPM | OXYGEN SATURATION: 97 % | WEIGHT: 143 LBS

## 2022-06-28 DIAGNOSIS — Z13.220 ENCOUNTER FOR SCREENING FOR LIPID DISORDER: ICD-10-CM

## 2022-06-28 DIAGNOSIS — Z13.1 SCREENING FOR DIABETES MELLITUS: ICD-10-CM

## 2022-06-28 DIAGNOSIS — Z01.419 WELL WOMAN EXAM WITH ROUTINE GYNECOLOGICAL EXAM: Primary | ICD-10-CM

## 2022-06-28 DIAGNOSIS — Z12.4 SCREENING FOR MALIGNANT NEOPLASM OF CERVIX: ICD-10-CM

## 2022-06-28 LAB
ALBUMIN SERPL-MCNC: 4.6 G/DL (ref 3.5–5.2)
ALP BLD-CCNC: 60 U/L (ref 35–104)
ALT SERPL-CCNC: 13 U/L (ref 5–33)
ANION GAP SERPL CALCULATED.3IONS-SCNC: 10 MMOL/L (ref 7–19)
AST SERPL-CCNC: 14 U/L (ref 5–32)
BILIRUB SERPL-MCNC: 0.4 MG/DL (ref 0.2–1.2)
BUN BLDV-MCNC: 12 MG/DL (ref 6–20)
CALCIUM SERPL-MCNC: 9.3 MG/DL (ref 8.6–10)
CHLORIDE BLD-SCNC: 101 MMOL/L (ref 98–111)
CHOLESTEROL, TOTAL: 196 MG/DL (ref 160–199)
CO2: 26 MMOL/L (ref 22–29)
CREAT SERPL-MCNC: 0.7 MG/DL (ref 0.5–0.9)
GFR AFRICAN AMERICAN: >59
GFR NON-AFRICAN AMERICAN: >60
GLUCOSE BLD-MCNC: 93 MG/DL (ref 74–109)
HDLC SERPL-MCNC: 72 MG/DL (ref 65–121)
LDL CHOLESTEROL CALCULATED: 108 MG/DL
POTASSIUM SERPL-SCNC: 4.1 MMOL/L (ref 3.5–5)
SODIUM BLD-SCNC: 137 MMOL/L (ref 136–145)
TOTAL PROTEIN: 7.2 G/DL (ref 6.6–8.7)
TRIGL SERPL-MCNC: 79 MG/DL (ref 0–149)

## 2022-06-28 PROCEDURE — 99396 PREV VISIT EST AGE 40-64: CPT | Performed by: NURSE PRACTITIONER

## 2022-06-28 ASSESSMENT — ENCOUNTER SYMPTOMS
RESPIRATORY NEGATIVE: 1
EYES NEGATIVE: 1
GASTROINTESTINAL NEGATIVE: 1

## 2022-06-28 NOTE — PATIENT INSTRUCTIONS
Continue The Zoloft  If you want something permanently done for birth control we could send you to GYN. Headache less than 5 try motrin, excedrin migraine or tylenol  For headache over 5 try nurtec lay down at least 15 min quiet dark area, cool rag  Keep a headache diary, there are many good apps for this. You are looking for triggers. Neurology referral if not improving.

## 2022-06-28 NOTE — PROGRESS NOTES
[facility] PHYSICIAN SERVICES  [facility]  [address] [place]  [address] [address]  Dept: [phone]  Dept Fax: [phone]  Loc: [phone]    Craol Ann Cueva is a 36 y.o. female who presents today for her medical conditions/complaints as noted below. Carol Ann Cueva is c/o of Annual Exam (Fasting. Defers mammogram.  Pap today.  )        HPI:     HPI   Chief Complaint   Patient presents with    Annual Exam     Fasting. Defers mammogram.  Pap today. She did have a mammogram and ultrasound of her right breast after I saw her last week and they do believe it is a hematoma. She thinks it may be a little better now she is supposed to go back in 3 months for a repeat. We did renew her Zoloft when she was here at the last visit. She has not done anything permanently to prevent pregnancy either her or her . She is fasting for lab work. She has been having some headaches more than normal.  She takes Excedrin or Tylenol usually they go away.   She do not think she is having more than 7 a month but at least 1 or 2-week  Past Medical History:   Diagnosis Date    Anxiety       Past Surgical History:   Procedure Laterality Date     SECTION      x3       Vitals 2022 2022 3/31/2021 2018 2018    SYSTOLIC 259 731 [phone] 260   DIASTOLIC 70 80 60 86 72 83   Site - - - - - -   Position - - - - - -   Cuff Size - - - - - -   Pulse 84 89 73 106 95 74   Temp 97.3 98.3 98 98.7 98.3 99.1   Resp - 16 16 16 18 20   SpO2 97 - - 98 96 97   Weight 143 lb 144 lb 160 lb 156 lb 9.6 oz 158 lb 3.2 oz 162 lb 9.6 oz   Height 5' 2\" 5' 2\" 5' 3\" 5' 3\" 5' 3\" 5' 3\"   Body mass index 26.15 kg/m2 26.34 kg/m2 28.34 kg/m2 27.74 kg/m2 28.02 kg/m2 28.8 kg/m2       Family History   Problem Relation Age of Onset    High Blood Pressure Mother     Heart Attack Father     Diabetes Maternal Grandfather     Breast Cancer Paternal Grandmother     Heart Attack Paternal Grandfather Social History     Tobacco Use    Smoking status: Never Smoker    Smokeless tobacco: Never Used   Substance Use Topics    Alcohol use: Yes      Current Outpatient Medications on File Prior to Visit   Medication Sig Dispense Refill    phentermine (ADIPEX-P) 37.5 MG tablet Take 37.5 mg by mouth every morning (before breakfast).  sertraline (ZOLOFT) 100 MG tablet Take 1 tablet by mouth daily 90 tablet 3     No current facility-administered medications on file prior to visit. No Known Allergies    Health Maintenance   Topic Date Due    COVID-19 Vaccine (1) Never done    HIV screen  Never done    Hepatitis C screen  Never done    DTaP/Tdap/Td vaccine (1 - Tdap) Never done    Flu vaccine (Season Ended) 09/01/2022    Depression Screen  06/22/2023    Cervical cancer screen  04/06/2024    Lipids  04/07/2026    Hepatitis A vaccine  Aged Out    Hepatitis B vaccine  Aged Out    Hib vaccine  Aged Out    Meningococcal (ACWY) vaccine  Aged Out    Pneumococcal 0-64 years Vaccine  Aged Out       Subjective:      Review of Systems   Constitutional: Negative. HENT: Negative. Eyes: Negative. Respiratory: Negative. Cardiovascular: Negative. Gastrointestinal: Negative. Genitourinary: Negative. Musculoskeletal: Negative. Skin: Negative. Neurological: Positive for headaches. Psychiatric/Behavioral: Positive for dysphoric mood (stable). Objective:     Physical Exam  Vitals and nursing note reviewed. Exam conducted with a chaperone present. Constitutional:       Appearance: Normal appearance. She is well-developed. HENT:      Head: Normocephalic. Right Ear: Tympanic membrane and external ear normal.      Left Ear: Tympanic membrane and external ear normal.      Nose: Nose normal.      Mouth/Throat:      Mouth: Mucous membranes are moist.   Eyes:      Pupils: Pupils are equal, round, and reactive to light.    Cardiovascular:      Rate and Rhythm: Normal rate and regular rhythm. Heart sounds: Normal heart sounds. Pulmonary:      Effort: Pulmonary effort is normal.      Breath sounds: Normal breath sounds. Chest:   Breasts:      Right: Mass, skin change and tenderness present. No swelling, bleeding, inverted nipple or nipple discharge. Left: Normal.         Abdominal:      General: Bowel sounds are normal.   Genitourinary:     Labia:         Right: No rash, tenderness or lesion. Left: No rash, tenderness, lesion or injury. Vagina: Normal.      Cervix: Normal.      Uterus: Normal.       Adnexa: Right adnexa normal and left adnexa normal.      Comments: Cervix was a little stenotic but I did collect the Pap smear from the cervix. Musculoskeletal:         General: Normal range of motion. Cervical back: Normal range of motion. Skin:     General: Skin is warm and dry. Capillary Refill: Capillary refill takes less than 2 seconds. Neurological:      General: No focal deficit present. Mental Status: She is alert and oriented to person, place, and time. Psychiatric:         Mood and Affect: Mood normal.         Behavior: Behavior normal.         Thought Content: Thought content normal.         Judgment: Judgment normal.       /70   Pulse 84   Temp 97.3 °F (36.3 °C)   Ht 5' 2\" (1.575 m)   Wt 143 lb (64.9 kg)   LMP 06/08/2022   SpO2 97%   BMI 26.16 kg/m²     Assessment:       Diagnosis Orders   1. Well woman exam with routine gynecological exam     2. Screening for malignant neoplasm of cervix  PAP SMEAR   3. Encounter for screening for lipid disorder  Lipid Panel   4. Screening for diabetes mellitus  Comprehensive Metabolic Panel         Plan:       PDMP Monitoring:    Last PDMP [place] as Reviewed [facility]):  Review User Review Instant Review Result            Urine Drug Screenings (1 yr)    No resulted procedures found.        Medication Contract and Consent for Opioid Use Documents Filed      No documents found Patient given educational materials -see patient instructions. Discussed use, benefit, and side effects of prescribed medications. All patient questions answered. Pt voiced understanding. Reviewed health maintenance. Instructed to continue currentmedications, diet and exercise. Patient agreed with treatment plan. Follow up as directed. MEDICATIONS:  No orders of the defined types were placed in this encounter. ORDERS:  Orders Placed This Encounter   Procedures    PAP SMEAR    Comprehensive Metabolic Panel    Lipid Panel       Follow-up:  No follow-ups on file. PATIENT INSTRUCTIONS:  Patient Instructions    Continue The Zoloft  If you want something permanently done for birth control we could send you to GYN. Headache less than 5 try motrin, excedrin migraine or tylenol  For headache over 5 try nurtec lay down at least 15 min quiet dark area, cool rag  Keep a headache diary, there are many good apps for this. You are looking for triggers. Neurology referral if not improving. Electronically signed by INGRID Glynn CNP on 6/28/2022 at 11:50 AM    EMR Dragon/transcription disclaimer:  Much of thisencounter note is electronic transcription/translation of spoken language to printed texts. The electronic translation of spoken language may be erroneous, or at times, nonsensical words or phrases may be inadvertentlytranscribed.   Although I have reviewed the note for such errors, some may still exist.

## 2022-06-29 LAB
CLINICAL REPORT: NORMAL
CYTOLOGY REVIEW, GYN: NORMAL
DIAGNOSIS ICD CODE: NORMAL
HB: CYTOTECHNOLT: NORMAL
Lab: NORMAL
Lab: NORMAL
MICROSCOPIC OBSERVATION: NORMAL
SPECIMEN ADEQUACY:: NORMAL

## 2022-08-24 ENCOUNTER — NURSE ONLY (OUTPATIENT)
Dept: PRIMARY CARE CLINIC | Age: 42
End: 2022-08-24
Payer: COMMERCIAL

## 2022-08-24 DIAGNOSIS — R30.0 DYSURIA: Primary | ICD-10-CM

## 2022-08-24 DIAGNOSIS — R10.2 PELVIC PRESSURE IN FEMALE: ICD-10-CM

## 2022-08-24 DIAGNOSIS — R82.998 LEUKOCYTES IN URINE: ICD-10-CM

## 2022-08-24 LAB
APPEARANCE FLUID: NORMAL
BILIRUBIN, POC: NORMAL
BLOOD URINE, POC: NORMAL
CLARITY, POC: NORMAL
COLOR, POC: NORMAL
GLUCOSE URINE, POC: NORMAL
KETONES, POC: NORMAL
LEUKOCYTE EST, POC: NORMAL
NITRITE, POC: NORMAL
PH, POC: 5
PROTEIN, POC: NORMAL
SPECIFIC GRAVITY, POC: 1
UROBILINOGEN, POC: NORMAL

## 2022-08-24 PROCEDURE — 81002 URINALYSIS NONAUTO W/O SCOPE: CPT | Performed by: NURSE PRACTITIONER

## 2022-08-24 RX ORDER — CIPROFLOXACIN 250 MG/1
250 TABLET, FILM COATED ORAL 2 TIMES DAILY
Qty: 14 TABLET | Refills: 0 | Status: SHIPPED | OUTPATIENT
Start: 2022-08-24 | End: 2022-08-31

## 2022-08-26 LAB
ORGANISM: ABNORMAL
URINE CULTURE, ROUTINE: ABNORMAL
URINE CULTURE, ROUTINE: ABNORMAL

## 2022-09-20 DIAGNOSIS — N63.0 MASS OF BREAST: ICD-10-CM

## 2022-09-20 DIAGNOSIS — S20.01XA CONTUSION OF RIGHT BREAST, INITIAL ENCOUNTER: Primary | ICD-10-CM

## 2022-09-20 DIAGNOSIS — S20.01XA CONTUSION OF RIGHT BREAST, INITIAL ENCOUNTER: ICD-10-CM

## 2022-10-13 RX ORDER — BUPROPION HYDROCHLORIDE 150 MG/1
150 TABLET ORAL EVERY MORNING
Qty: 90 TABLET | Refills: 3 | Status: SHIPPED | OUTPATIENT
Start: 2022-10-13

## 2022-11-30 ENCOUNTER — OFFICE VISIT (OUTPATIENT)
Dept: PRIMARY CARE CLINIC | Age: 42
End: 2022-11-30
Payer: COMMERCIAL

## 2022-11-30 VITALS
SYSTOLIC BLOOD PRESSURE: 122 MMHG | BODY MASS INDEX: 26.61 KG/M2 | WEIGHT: 144.6 LBS | DIASTOLIC BLOOD PRESSURE: 80 MMHG | TEMPERATURE: 98.8 F | HEART RATE: 84 BPM | RESPIRATION RATE: 16 BRPM | OXYGEN SATURATION: 98 % | HEIGHT: 62 IN

## 2022-11-30 DIAGNOSIS — M54.2 NECK PAIN, MUSCULOSKELETAL: Primary | ICD-10-CM

## 2022-11-30 PROCEDURE — 99213 OFFICE O/P EST LOW 20 MIN: CPT | Performed by: FAMILY MEDICINE

## 2022-11-30 RX ORDER — CYANOCOBALAMIN 1000 UG/ML
1000 INJECTION, SOLUTION INTRAMUSCULAR; SUBCUTANEOUS ONCE
COMMUNITY

## 2022-11-30 RX ORDER — TIZANIDINE 2 MG/1
2 TABLET ORAL EVERY 6 HOURS PRN
Qty: 30 TABLET | Refills: 0 | Status: SHIPPED | OUTPATIENT
Start: 2022-11-30

## 2022-11-30 RX ORDER — BUPROPION HYDROCHLORIDE 150 MG/1
TABLET, EXTENDED RELEASE ORAL
COMMUNITY
Start: 2022-09-18 | End: 2022-11-30 | Stop reason: ALTCHOICE

## 2022-11-30 ASSESSMENT — ENCOUNTER SYMPTOMS
ABDOMINAL PAIN: 0
CHEST TIGHTNESS: 0
SHORTNESS OF BREATH: 0
WHEEZING: 0
BLOOD IN STOOL: 0

## 2022-11-30 NOTE — PROGRESS NOTES
PHYSICIAN NEXT STEPS:  Review Only    CHIEF COMPLAINT:  Chief Complaint/Protocol Used: Neurologic Deficit  Onset: Left side tingling-wrist to hand and left ankle to foot-started 1 hr and 15 min. ago-715am, now left side face feels tight      ASSESSMENT:  ? Onset: Left side tingling-wrist to hand and left ankle to foot-started 1 hr and 15 min. ago-715am, now left side face feels tight  ? Normal True  ? Normal, no trouble breathing True  ? Symptom: Left side tingling-wrist to hand and left ankle to foot-started 1 hr and 15 min. ago-715am, now tightness to left side face  ? Onset: Left side tingling-wrist to hand and left ankle to foot-started 1 hr and 15 min. ago-715am  ? Last Normal: Before 715am today  ? PATTERN \"Does this come and go, or has it been constant since it started?\" \"Is it present now?\" Constant  ? Cardiac Symptoms: No  ? Neurologic Symptoms: Mild dizziness with walking  ? Other Symptoms: None  -------------------------------------------------------    DISPOSITION:  Disposition Recommendation: Call  Now  Questions that led to disposition:  ? Sounds like a life-threatening emergency to the triager  Patient Directed To: Unspecified  Patient Intended Action: Call 911    CALL NOTES:  01/04/2020 at 8:30 AM by Reva Sims  ? Patient called back after approximately 3 minutes and phone was busy. Patient called back 3 more times and phone busy. 4th call, patient answered and states she did call 911 and called her daughter. Door unlocked and getting medication bottles in a   bag. RN stayed on phone with patient until paramedics arrived.   ? Left side tingling-wrist to hand and left ankle to foot-started 1 hr and 15 min. ago-715am., PCP-Katie Rodas    DISPOSITION OVERRIDE/PROVIDER CONSULT:  Disposition Override: N/A  Override Source: Unspecified  Consulted with PCP: No  Consulted with On-Call Physician: No    CALLER CONTACT INFO:  Name: Fabiola Randolph (Self)  Phone 1: [phone] (Home Phone)  Yazmin Hoang (:  1980) is a 36 y.o. female,Established patient, here for evaluation of the following chief complaint(s):  Pharyngitis and Neck Pain (Started mid day Saturday and has gotten much worse and cannot turn her head, headache comes and goes. Throbbing, worse at night and doesn't feel like a crick in her neck. No fever, no drainage, no congestion)         ASSESSMENT/PLAN:  1. Neck pain, musculoskeletal    No high risk findings and physical exam of patient or in patient history. Low suspicion for tonsillar abscess or neck mass. Patient advised on stretching and will prescribe tizanidine for muscle neck spasm. Patient encouraged to can use this alongside of an NSAID or Tylenol. Patient counseled that she should not drive or operate heavy machinery until she knows how this will affect her. patient encouraged that if this does not resolve over the next 1 to 2 weeks to notify us as she may need further testing. No follow-ups on file. Subjective   SUBJECTIVE/OBJECTIVE:  Yazmin Hoang is a 36 y.o. female who presents due to neck pain which has been ongoing since Saturday. Patient says it seems to be worsening and she seems to be having some throat pain on that side as well. Patient says that the area seems to throb and is worse at night. Patient denies any weight loss or fever. Patient denies any upper extremity or lower extremity weakness. She is having some pain with swallowing. Patient says that her neck is more painful with movement. Pharyngitis  Associated symptoms include myalgias and neck pain. Pertinent negatives include no abdominal pain, chest pain, congestion, fever, headaches or weakness. Neck Pain   Pertinent negatives include no chest pain, fever, headaches or weakness. Review of Systems   Constitutional:  Negative for activity change and fever. HENT:  Negative for congestion. Eyes:  Negative for visual disturbance.    Respiratory:  Negative for - Preferred      ENCOUNTER STARTED:  01/04/20 08:11:19 AM  ENCOUNTER ASSIGNED TO/CLOSED BY:  Reva Sims @ 01/04/20 08:30:52 AM      -------------------------------------------------------    CARE ADVICE given per Neurologic Deficit guideline.  CALL  NOW: Immediate medical attention is needed. You need to hang up and call 911 (or an ambulance). (Triager Discretion: I'll call you back in a few minutes to be sure you were able to reach them.)      UNDERSTANDS CARE ADVICE: Yes    AGREES WITH CARE ADVICE: Yes    WILL FOLLOW CARE ADVICE: Yes    -------------------------------------------------------   chest tightness, shortness of breath and wheezing. Cardiovascular:  Negative for chest pain. Gastrointestinal:  Negative for abdominal pain and blood in stool. Genitourinary:  Negative for difficulty urinating and urgency. Musculoskeletal:  Positive for myalgias and neck pain. Neurological:  Negative for weakness and headaches. Psychiatric/Behavioral:  Negative for confusion. Objective   Physical Exam  Vitals reviewed. Constitutional:       General: She is not in acute distress. Appearance: Normal appearance. She is not ill-appearing. HENT:      Head: Normocephalic and atraumatic. Mouth/Throat:      Tongue: Tongue does not deviate from midline. Pharynx: Uvula midline. Tonsils: No tonsillar exudate or tonsillar abscesses. Comments: No uvular deviation  Neck:      Thyroid: No thyroid mass. Meningeal: Brudzinski's sign and Kernig's sign absent. Comments: Tenderness with palpation of right trapezius at level of shoulder, no tenderness of palpation sternocleidomastoid or neck otherwise, pain with flexion of right neck rotators  Negative lehrmette  Cardiovascular:      Rate and Rhythm: Normal rate and regular rhythm. Pulses: Normal pulses. Heart sounds: Normal heart sounds. No murmur heard. Pulmonary:      Effort: Pulmonary effort is normal. No respiratory distress. Breath sounds: Normal breath sounds. No wheezing or rhonchi. Chest:      Chest wall: No tenderness. Abdominal:      General: Abdomen is flat. Bowel sounds are normal. There is no distension. Palpations: Abdomen is soft. Tenderness: There is no abdominal tenderness. Musculoskeletal:         General: Tenderness present. No swelling. Skin:     General: Skin is warm and dry. Neurological:      General: No focal deficit present. Mental Status: She is alert.           Vitals:    11/30/22 1131   BP: 122/80   Pulse: 84   Resp: 16   Temp: 98.8 °F (37.1 °C)   SpO2: 98% Current Outpatient Medications   Medication Sig Dispense Refill    cyanocobalamin 1000 MCG/ML injection Inject 1,000 mcg into the muscle once      tiZANidine (ZANAFLEX) 2 MG tablet Take 1 tablet by mouth every 6 hours as needed (for neck ache) 30 tablet 0    buPROPion (WELLBUTRIN XL) 150 MG extended release tablet Take 1 tablet by mouth every morning Stop the 100mg 90 tablet 3    phentermine (ADIPEX-P) 37.5 MG tablet Take 37.5 mg by mouth every morning (before breakfast). sertraline (ZOLOFT) 100 MG tablet Take 1 tablet by mouth daily 90 tablet 3     No current facility-administered medications for this visit. Family History   Problem Relation Age of Onset    High Blood Pressure Mother     Heart Attack Father     Diabetes Maternal Grandfather     Breast Cancer Paternal Grandmother     Heart Attack Paternal Grandfather       Past Medical History:   Diagnosis Date    Anxiety       Past Surgical History:   Procedure Laterality Date     SECTION      x3      No Known Allergies     Lab Results   Component Value Date     2022    K 4.1 2022     2022    CO2 26 2022    BUN 12 2022    CREATININE 0.7 2022    GLUCOSE 93 2022    CALCIUM 9.3 2022    PROT 7.2 2022    LABALBU 4.6 2022    BILITOT 0.4 2022    ALKPHOS 60 2022    AST 14 2022    ALT 13 2022    LABGLOM >60 2022    GFRAA >59 2022        Lab Results   Component Value Date    WBC 7.2 2021    HGB 12.3 2021    HCT 38.0 2021    MCV 90.7 2021     2021                EMR Dragon/transcription disclaimer:  Much of this encounter note is electronic transcription/translation of spoken language toprinted texts. The electronic translation of spoken language may be erroneous, or at times, nonsensical words or phrases may be inadvertently transcribed.   Although I have reviewed the note for such errors, some may stillexist.      An electronic signature was used to authenticate this note.     --Jose Soria MD

## 2022-12-05 RX ORDER — TIZANIDINE 2 MG/1
TABLET ORAL
Qty: 30 TABLET | Refills: 0 | OUTPATIENT
Start: 2022-12-05

## 2023-03-02 ENCOUNTER — PATIENT MESSAGE (OUTPATIENT)
Dept: PRIMARY CARE CLINIC | Age: 43
End: 2023-03-02

## 2023-03-03 RX ORDER — AZITHROMYCIN 250 MG/1
TABLET, FILM COATED ORAL
Qty: 1 PACKET | Refills: 0 | Status: SHIPPED | OUTPATIENT
Start: 2023-03-03 | End: 2023-03-13

## 2023-03-03 NOTE — TELEPHONE ENCOUNTER
From: Dylon Edwards  Sent: 3/2/2023 6:48 PM CST  Subject: Prescription needed    Hi Mago! I have had a deep cough and congestion for about a week that I havent been able to get rid of. Could you possibly call me in a script for a Z-pack? My pharmacy is [facility]. Thank you!

## 2023-03-08 ENCOUNTER — PATIENT MESSAGE (OUTPATIENT)
Dept: PRIMARY CARE CLINIC | Age: 43
End: 2023-03-08

## 2023-03-08 RX ORDER — FLUCONAZOLE 150 MG/1
TABLET ORAL
Qty: 2 TABLET | Refills: 0 | Status: SHIPPED | OUTPATIENT
Start: 2023-03-08

## 2023-03-08 NOTE — TELEPHONE ENCOUNTER
From: Dave Lau  To: Le Marquez  Sent: 3/8/2023 10:55 AM CST  Subject: Prescription needed     Hi Mago, I finished my antibiotic yesterday and feel like I have a yeast infection starting. Could you call me in something for that please? Pharmacy is [facility].

## 2023-05-05 RX ORDER — SERTRALINE HYDROCHLORIDE 100 MG/1
100 TABLET, FILM COATED ORAL DAILY
Qty: 90 TABLET | Refills: 3 | Status: SHIPPED | OUTPATIENT
Start: 2023-05-05

## 2023-08-14 ENCOUNTER — TELEPHONE (OUTPATIENT)
Dept: PRIMARY CARE CLINIC | Age: 43
End: 2023-08-14

## 2023-08-14 NOTE — TELEPHONE ENCOUNTER
Pt states she accidentally took her Dogs Phenobarbital 37 mg. Asking if she should be concerned? I was thinking she should call Poison Control?

## 2023-08-14 NOTE — TELEPHONE ENCOUNTER
Pt notified to call Poison Control. Pt states she did and was told she would probably be drowsy but should be alright. I advised Pt to drink plenty of fluids today.

## 2023-09-21 ENCOUNTER — OFFICE VISIT (OUTPATIENT)
Dept: PRIMARY CARE CLINIC | Age: 43
End: 2023-09-21
Payer: COMMERCIAL

## 2023-09-21 VITALS
OXYGEN SATURATION: 95 % | WEIGHT: 133.6 LBS | DIASTOLIC BLOOD PRESSURE: 70 MMHG | HEIGHT: 62 IN | SYSTOLIC BLOOD PRESSURE: 116 MMHG | TEMPERATURE: 99 F | HEART RATE: 98 BPM | BODY MASS INDEX: 24.59 KG/M2

## 2023-09-21 DIAGNOSIS — Z12.4 SCREENING FOR CERVICAL CANCER: ICD-10-CM

## 2023-09-21 DIAGNOSIS — Z00.00 WELL ADULT EXAM: Primary | ICD-10-CM

## 2023-09-21 DIAGNOSIS — Z13.220 ENCOUNTER FOR SCREENING FOR LIPID DISORDER: ICD-10-CM

## 2023-09-21 DIAGNOSIS — N92.6 IRREGULAR MENSES: ICD-10-CM

## 2023-09-21 DIAGNOSIS — Z13.1 SCREENING FOR DIABETES MELLITUS: ICD-10-CM

## 2023-09-21 DIAGNOSIS — Z12.31 ENCOUNTER FOR SCREENING MAMMOGRAM FOR MALIGNANT NEOPLASM OF BREAST: ICD-10-CM

## 2023-09-21 LAB
ALBUMIN SERPL-MCNC: 4.7 G/DL (ref 3.5–5.2)
ALP SERPL-CCNC: 57 U/L (ref 35–104)
ALT SERPL-CCNC: 12 U/L (ref 5–33)
ANION GAP SERPL CALCULATED.3IONS-SCNC: 12 MMOL/L (ref 7–19)
AST SERPL-CCNC: 14 U/L (ref 5–32)
BILIRUB SERPL-MCNC: 0.3 MG/DL (ref 0.2–1.2)
BUN SERPL-MCNC: 13 MG/DL (ref 6–20)
CALCIUM SERPL-MCNC: 9.6 MG/DL (ref 8.6–10)
CHLORIDE SERPL-SCNC: 104 MMOL/L (ref 98–111)
CHOLEST SERPL-MCNC: 187 MG/DL (ref 160–199)
CO2 SERPL-SCNC: 25 MMOL/L (ref 22–29)
CREAT SERPL-MCNC: 0.7 MG/DL (ref 0.5–0.9)
ESTRADIOL SERPL-SCNC: 603.8 PG/ML
GLUCOSE SERPL-MCNC: 80 MG/DL (ref 74–109)
HDLC SERPL-MCNC: 75 MG/DL (ref 65–121)
LDLC SERPL CALC-MCNC: 103 MG/DL
POTASSIUM SERPL-SCNC: 5 MMOL/L (ref 3.5–5)
PROGEST SERPL-MCNC: 0.13 NG/ML
PROT SERPL-MCNC: 7.2 G/DL (ref 6.6–8.7)
SODIUM SERPL-SCNC: 141 MMOL/L (ref 136–145)
TRIGL SERPL-MCNC: 45 MG/DL (ref 0–149)

## 2023-09-21 PROCEDURE — 99396 PREV VISIT EST AGE 40-64: CPT | Performed by: NURSE PRACTITIONER

## 2023-09-21 RX ORDER — BUPROPION HYDROCHLORIDE 150 MG/1
150 TABLET ORAL EVERY MORNING
Qty: 90 TABLET | Refills: 3 | Status: SHIPPED | OUTPATIENT
Start: 2023-09-21

## 2023-09-21 RX ORDER — SERTRALINE HYDROCHLORIDE 100 MG/1
100 TABLET, FILM COATED ORAL DAILY
Qty: 90 TABLET | Refills: 3 | Status: SHIPPED | OUTPATIENT
Start: 2023-09-21

## 2023-09-21 RX ORDER — SUMATRIPTAN 50 MG/1
50 TABLET, FILM COATED ORAL
Qty: 9 TABLET | Refills: 1 | Status: SHIPPED | OUTPATIENT
Start: 2023-09-21 | End: 2023-09-21

## 2023-09-21 RX ORDER — NYSTATIN 100000 U/G
OINTMENT TOPICAL
Qty: 30 G | Refills: 0 | Status: SHIPPED | OUTPATIENT
Start: 2023-09-21

## 2023-09-21 RX ORDER — TIZANIDINE 2 MG/1
2 TABLET ORAL EVERY 6 HOURS PRN
Qty: 60 TABLET | Refills: 0 | Status: SHIPPED | OUTPATIENT
Start: 2023-09-21

## 2023-09-21 RX ORDER — ONDANSETRON 4 MG/1
4 TABLET, FILM COATED ORAL 3 TIMES DAILY PRN
Qty: 30 TABLET | Refills: 0 | Status: SHIPPED | OUTPATIENT
Start: 2023-09-21

## 2023-09-21 SDOH — ECONOMIC STABILITY: HOUSING INSECURITY
IN THE LAST 12 MONTHS, WAS THERE A TIME WHEN YOU DID NOT HAVE A STEADY PLACE TO SLEEP OR SLEPT IN A SHELTER (INCLUDING NOW)?: NO

## 2023-09-21 SDOH — ECONOMIC STABILITY: FOOD INSECURITY: WITHIN THE PAST 12 MONTHS, THE FOOD YOU BOUGHT JUST DIDN'T LAST AND YOU DIDN'T HAVE MONEY TO GET MORE.: NEVER TRUE

## 2023-09-21 SDOH — ECONOMIC STABILITY: FOOD INSECURITY: WITHIN THE PAST 12 MONTHS, YOU WORRIED THAT YOUR FOOD WOULD RUN OUT BEFORE YOU GOT MONEY TO BUY MORE.: NEVER TRUE

## 2023-09-21 SDOH — ECONOMIC STABILITY: INCOME INSECURITY: HOW HARD IS IT FOR YOU TO PAY FOR THE VERY BASICS LIKE FOOD, HOUSING, MEDICAL CARE, AND HEATING?: NOT HARD AT ALL

## 2023-09-21 ASSESSMENT — ENCOUNTER SYMPTOMS
GASTROINTESTINAL NEGATIVE: 1
RESPIRATORY NEGATIVE: 1
EYES NEGATIVE: 1

## 2023-09-21 ASSESSMENT — PATIENT HEALTH QUESTIONNAIRE - PHQ9
SUM OF ALL RESPONSES TO PHQ QUESTIONS 1-9: 0
1. LITTLE INTEREST OR PLEASURE IN DOING THINGS: 0
SUM OF ALL RESPONSES TO PHQ9 QUESTIONS 1 & 2: 0
SUM OF ALL RESPONSES TO PHQ QUESTIONS 1-9: 0
SUM OF ALL RESPONSES TO PHQ QUESTIONS 1-9: 0
2. FEELING DOWN, DEPRESSED OR HOPELESS: 0
SUM OF ALL RESPONSES TO PHQ QUESTIONS 1-9: 0

## 2023-09-21 NOTE — PATIENT INSTRUCTIONS
Headache less than 5 try zofran if nausea and motrin, excedrin migraine or tylenol  For headache over 5 try zofran , imitrex, lay down at least 15 min quiet dark area, cool rag  If headache doesn't go under 5 in 1 hour you can repeat the dose once or try the sample of nurtec. Keep a headache diary, there are many good apps for this. You are looking for triggers. Neurology referral if not improving. Or put you on a daily preventive or a monthly shot. Try the theraputic massage  Use the zanaflex at night to see if helps neck and do neck stretches. Do the bicep exercises at home alternate heat before exercise , ice afer. Heat bring circulation and ice help inflammmation try twice a day if not better refer Physical therapy and they may require xray. Hormone check today may consider low dose hormones.

## 2023-09-26 LAB
AGE GDLN ACOG TESTING: NORMAL
CLINICAL REPORT: NORMAL
CYTOLOGY REVIEW, GYN: NORMAL
DIAGNOSIS ICD CODE: NORMAL
HB: CYTOTECHNOLT: NORMAL
HPV 16+18+31+33+35+39+45+51+52+56+58+59+68 DNA,CERVIX,PROBE: NEGATIVE
HPV GENOTYPE REFLEX: NORMAL
Lab: NORMAL
MICROSCOPIC OBSERVATION: NORMAL
SPECIMEN ADEQUACY:: NORMAL

## 2023-09-28 ENCOUNTER — PATIENT MESSAGE (OUTPATIENT)
Dept: PRIMARY CARE CLINIC | Age: 43
End: 2023-09-28

## 2023-09-28 RX ORDER — VALACYCLOVIR HYDROCHLORIDE 1 G/1
TABLET, FILM COATED ORAL
Qty: 12 TABLET | Refills: 0 | Status: SHIPPED | OUTPATIENT
Start: 2023-09-28

## 2023-09-28 NOTE — TELEPHONE ENCOUNTER
From: Сергей Sparks  Sent: 9/28/2023 11:58 AM CDT  Subject: Prescription     Hi Mago! I forgot to ask if you could prescribe me valtrex pills for fever blisters. I have been getting them quite a bit over the last few months.  I have cream I put on the blister but if I could get something else to help that would be great!!     Thank you!!

## 2023-10-13 DIAGNOSIS — E53.8 B12 DEFICIENCY: ICD-10-CM

## 2023-10-13 DIAGNOSIS — R53.83 OTHER FATIGUE: Primary | ICD-10-CM

## 2023-10-13 LAB
ERYTHROCYTE [DISTWIDTH] IN BLOOD BY AUTOMATED COUNT: 12.1 % (ref 11.5–14.5)
HCT VFR BLD AUTO: 35.6 % (ref 37–47)
HGB BLD-MCNC: 11.7 G/DL (ref 12–16)
IRON SATN MFR SERPL: 18 % (ref 14–50)
IRON SERPL-MCNC: 59 UG/DL (ref 37–145)
MCH RBC QN AUTO: 30.2 PG (ref 27–31)
MCHC RBC AUTO-ENTMCNC: 32.9 G/DL (ref 33–37)
MCV RBC AUTO: 91.8 FL (ref 81–99)
PLATELET # BLD AUTO: 319 K/UL (ref 130–400)
PMV BLD AUTO: 10.3 FL (ref 9.4–12.3)
RBC # BLD AUTO: 3.88 M/UL (ref 4.2–5.4)
TIBC SERPL-MCNC: 322 UG/DL (ref 250–400)
VIT B12 SERPL-MCNC: >2000 PG/ML (ref 211–946)
WBC # BLD AUTO: 5.3 K/UL (ref 4.8–10.8)

## 2023-10-18 RX ORDER — BUPROPION HYDROCHLORIDE 150 MG/1
TABLET ORAL
Qty: 90 TABLET | Refills: 3 | Status: SHIPPED | OUTPATIENT
Start: 2023-10-18

## 2023-11-08 DIAGNOSIS — Z12.31 ENCOUNTER FOR SCREENING MAMMOGRAM FOR MALIGNANT NEOPLASM OF BREAST: ICD-10-CM

## 2023-11-13 RX ORDER — TIZANIDINE 2 MG/1
TABLET ORAL
Qty: 60 TABLET | Refills: 0 | Status: SHIPPED | OUTPATIENT
Start: 2023-11-13

## 2023-11-17 ENCOUNTER — PATIENT MESSAGE (OUTPATIENT)
Dept: PRIMARY CARE CLINIC | Age: 43
End: 2023-11-17

## 2023-11-17 RX ORDER — METHYLPREDNISOLONE 4 MG/1
TABLET ORAL
Qty: 1 KIT | Refills: 0 | Status: SHIPPED | OUTPATIENT
Start: 2023-11-17 | End: 2023-11-23

## 2023-11-17 RX ORDER — AZITHROMYCIN 250 MG/1
TABLET, FILM COATED ORAL
Qty: 1 PACKET | Refills: 0 | Status: SHIPPED | OUTPATIENT
Start: 2023-11-17 | End: 2023-11-27

## 2023-11-29 RX ORDER — TIZANIDINE 2 MG/1
TABLET ORAL
Qty: 60 TABLET | Refills: 0 | Status: SHIPPED | OUTPATIENT
Start: 2023-11-29

## 2023-12-14 ENCOUNTER — PATIENT MESSAGE (OUTPATIENT)
Dept: PRIMARY CARE CLINIC | Age: 43
End: 2023-12-14

## 2023-12-14 RX ORDER — AMOXICILLIN AND CLAVULANATE POTASSIUM 875; 125 MG/1; MG/1
1 TABLET, FILM COATED ORAL 2 TIMES DAILY
Qty: 14 TABLET | Refills: 0 | Status: SHIPPED | OUTPATIENT
Start: 2023-12-14 | End: 2023-12-21

## 2023-12-14 RX ORDER — ALBUTEROL SULFATE 1.25 MG/3ML
1 SOLUTION RESPIRATORY (INHALATION) EVERY 6 HOURS PRN
Qty: 120 ML | Refills: 3 | Status: SHIPPED | OUTPATIENT
Start: 2023-12-14

## 2023-12-14 NOTE — TELEPHONE ENCOUNTER
From: Jerri Cloud  To: Hernan Silva  Sent: 12/14/2023 12:57 PM CST  Subject: Script Request    Hi Mago, I seem to be dealing with the same thing I had from the last time I requested meds. I got better for a bit and I have it again. Most likely my kids keep passing it to me. :( I'm having ear aches and I can tell my ears are full of fluid. Could you prescribe me a antibiotic? Also, could you prescribe me a nebulizer and medicine for some breathing treatments?

## 2024-09-19 ENCOUNTER — OFFICE VISIT (OUTPATIENT)
Dept: PRIMARY CARE CLINIC | Age: 44
End: 2024-09-19
Payer: COMMERCIAL

## 2024-09-19 VITALS
WEIGHT: 151.6 LBS | HEIGHT: 62 IN | RESPIRATION RATE: 16 BRPM | TEMPERATURE: 98.2 F | BODY MASS INDEX: 27.9 KG/M2 | SYSTOLIC BLOOD PRESSURE: 120 MMHG | DIASTOLIC BLOOD PRESSURE: 72 MMHG | HEART RATE: 72 BPM | OXYGEN SATURATION: 97 %

## 2024-09-19 DIAGNOSIS — Z12.31 ENCOUNTER FOR SCREENING MAMMOGRAM FOR MALIGNANT NEOPLASM OF BREAST: ICD-10-CM

## 2024-09-19 DIAGNOSIS — Z01.419 WELL WOMAN EXAM WITH ROUTINE GYNECOLOGICAL EXAM: Primary | ICD-10-CM

## 2024-09-19 DIAGNOSIS — Z12.4 ROUTINE CERVICAL SMEAR: ICD-10-CM

## 2024-09-19 PROCEDURE — 99396 PREV VISIT EST AGE 40-64: CPT | Performed by: NURSE PRACTITIONER

## 2024-09-19 RX ORDER — SUMATRIPTAN 50 MG/1
50 TABLET, FILM COATED ORAL DAILY PRN
Qty: 27 TABLET | Refills: 3 | Status: SHIPPED | OUTPATIENT
Start: 2024-09-19

## 2024-09-19 RX ORDER — VALACYCLOVIR HYDROCHLORIDE 1 G/1
TABLET, FILM COATED ORAL
Qty: 12 TABLET | Refills: 3 | Status: SHIPPED | OUTPATIENT
Start: 2024-09-19

## 2024-09-19 RX ORDER — SERTRALINE HYDROCHLORIDE 100 MG/1
100 TABLET, FILM COATED ORAL DAILY
Qty: 90 TABLET | Refills: 3 | Status: SHIPPED | OUTPATIENT
Start: 2024-09-19

## 2024-09-19 RX ORDER — BUPROPION HYDROCHLORIDE 150 MG/1
150 TABLET ORAL EVERY MORNING
Qty: 90 TABLET | Refills: 3 | Status: SHIPPED | OUTPATIENT
Start: 2024-09-19

## 2024-09-19 ASSESSMENT — PATIENT HEALTH QUESTIONNAIRE - PHQ9
SUM OF ALL RESPONSES TO PHQ9 QUESTIONS 1 & 2: 0
2. FEELING DOWN, DEPRESSED OR HOPELESS: NOT AT ALL
SUM OF ALL RESPONSES TO PHQ QUESTIONS 1-9: 0

## 2024-09-19 NOTE — PATIENT INSTRUCTIONS
May message me anytime about compound at [facility] for weight loss.  Continue the Zoloft and Wellbutrin daily I sent it to mail order.

## 2024-09-19 NOTE — PROGRESS NOTES
translation of spoken language may be erroneous, or at times, nonsensical words or phrases may be inadvertentlytranscribed.  Although I have reviewed the note for such errors, some may still exist.

## 2024-09-30 ASSESSMENT — ENCOUNTER SYMPTOMS
RESPIRATORY NEGATIVE: 1
GASTROINTESTINAL NEGATIVE: 1
EYES NEGATIVE: 1

## 2024-10-01 ENCOUNTER — LAB (OUTPATIENT)
Dept: PRIMARY CARE CLINIC | Age: 44
End: 2024-10-01

## 2024-10-01 DIAGNOSIS — R53.83 OTHER FATIGUE: ICD-10-CM

## 2024-10-01 DIAGNOSIS — N92.6 IRREGULAR MENSES: ICD-10-CM

## 2024-10-01 DIAGNOSIS — Z00.00 WELL ADULT EXAM: ICD-10-CM

## 2024-10-01 DIAGNOSIS — Z13.220 ENCOUNTER FOR SCREENING FOR LIPID DISORDER: Primary | ICD-10-CM

## 2024-10-01 DIAGNOSIS — Z13.1 SCREENING FOR DIABETES MELLITUS: ICD-10-CM

## 2024-10-01 LAB
ALBUMIN SERPL-MCNC: 4.6 G/DL (ref 3.5–5.2)
ALP SERPL-CCNC: 58 U/L (ref 35–104)
ALT SERPL-CCNC: 19 U/L (ref 5–33)
ANION GAP SERPL CALCULATED.3IONS-SCNC: 10 MMOL/L (ref 7–19)
AST SERPL-CCNC: 18 U/L (ref 5–32)
BILIRUB SERPL-MCNC: 0.2 MG/DL (ref 0.2–1.2)
BUN SERPL-MCNC: 27 MG/DL (ref 6–20)
CALCIUM SERPL-MCNC: 9.2 MG/DL (ref 8.6–10)
CHLORIDE SERPL-SCNC: 105 MMOL/L (ref 98–111)
CHOLEST SERPL-MCNC: 197 MG/DL (ref 0–199)
CO2 SERPL-SCNC: 26 MMOL/L (ref 22–29)
CREAT SERPL-MCNC: 0.8 MG/DL (ref 0.5–0.9)
ERYTHROCYTE [DISTWIDTH] IN BLOOD BY AUTOMATED COUNT: 13.2 % (ref 11.5–14.5)
GLUCOSE SERPL-MCNC: 83 MG/DL (ref 70–99)
HBA1C MFR BLD: 5.3 % (ref 4–5.6)
HCT VFR BLD AUTO: 38.5 % (ref 37–47)
HDLC SERPL-MCNC: 67 MG/DL (ref 40–60)
HGB BLD-MCNC: 12.3 G/DL (ref 12–16)
LDLC SERPL CALC-MCNC: 117 MG/DL
MCH RBC QN AUTO: 28.9 PG (ref 27–31)
MCHC RBC AUTO-ENTMCNC: 31.9 G/DL (ref 33–37)
MCV RBC AUTO: 90.6 FL (ref 81–99)
PLATELET # BLD AUTO: 341 K/UL (ref 130–400)
PMV BLD AUTO: 10.6 FL (ref 9.4–12.3)
POTASSIUM SERPL-SCNC: 4.6 MMOL/L (ref 3.5–5)
PROT SERPL-MCNC: 7.3 G/DL (ref 6.4–8.3)
RBC # BLD AUTO: 4.25 M/UL (ref 4.2–5.4)
SODIUM SERPL-SCNC: 141 MMOL/L (ref 136–145)
TRIGL SERPL-MCNC: 65 MG/DL (ref 0–149)
TSH SERPL DL<=0.005 MIU/L-ACNC: 2.3 UIU/ML (ref 0.27–4.2)
VIT B12 SERPL-MCNC: 942 PG/ML (ref 232–1245)
WBC # BLD AUTO: 4.9 K/UL (ref 4.8–10.8)

## 2024-12-30 LAB — NONINV COLON CA DNA+OCC BLD SCRN STL QL: NEGATIVE

## 2025-05-20 ENCOUNTER — TELEPHONE (OUTPATIENT)
Age: 45
End: 2025-05-20
Payer: COMMERCIAL

## 2025-05-28 ENCOUNTER — TELEPHONE (OUTPATIENT)
Age: 45
End: 2025-05-28
Payer: COMMERCIAL

## 2025-06-05 ENCOUNTER — TELEPHONE (OUTPATIENT)
Age: 45
End: 2025-06-05
Payer: COMMERCIAL

## 2025-07-28 ENCOUNTER — OFFICE VISIT (OUTPATIENT)
Age: 45
End: 2025-07-28
Payer: COMMERCIAL

## 2025-07-28 VITALS — SYSTOLIC BLOOD PRESSURE: 108 MMHG | TEMPERATURE: 98.6 F | HEART RATE: 73 BPM | DIASTOLIC BLOOD PRESSURE: 74 MMHG

## 2025-07-28 DIAGNOSIS — D18.01 ANGIOMA OF SKIN: ICD-10-CM

## 2025-07-28 DIAGNOSIS — D22.9 BENIGN NEVUS: Primary | ICD-10-CM

## 2025-07-28 RX ORDER — BUPROPION HYDROCHLORIDE 100 MG/1
100 TABLET ORAL 2 TIMES DAILY
COMMUNITY

## 2025-08-07 ENCOUNTER — PATIENT ROUNDING (BHMG ONLY) (OUTPATIENT)
Age: 45
End: 2025-08-07
Payer: COMMERCIAL